# Patient Record
Sex: MALE | URBAN - METROPOLITAN AREA
[De-identification: names, ages, dates, MRNs, and addresses within clinical notes are randomized per-mention and may not be internally consistent; named-entity substitution may affect disease eponyms.]

---

## 2024-02-01 ENCOUNTER — LAB (OUTPATIENT)
Dept: URBAN - METROPOLITAN AREA MEDICAL CENTER 25 | Facility: MEDICAL CENTER | Age: 84
End: 2024-02-01
Payer: MEDICARE

## 2024-02-01 DIAGNOSIS — D64.89 ANEMIA DUE TO OTHER CAUSE: ICD-10-CM

## 2024-02-01 PROCEDURE — 99254 IP/OBS CNSLTJ NEW/EST MOD 60: CPT | Performed by: INTERNAL MEDICINE

## 2024-02-02 ENCOUNTER — LAB (OUTPATIENT)
Dept: URBAN - METROPOLITAN AREA MEDICAL CENTER 25 | Facility: MEDICAL CENTER | Age: 84
End: 2024-02-02
Payer: MEDICARE

## 2024-02-02 DIAGNOSIS — D64.89 ANEMIA DUE TO OTHER CAUSE: ICD-10-CM

## 2024-02-02 PROCEDURE — 99231 SBSQ HOSP IP/OBS SF/LOW 25: CPT | Performed by: INTERNAL MEDICINE

## 2024-02-05 ENCOUNTER — LAB (OUTPATIENT)
Dept: URBAN - METROPOLITAN AREA MEDICAL CENTER 25 | Facility: MEDICAL CENTER | Age: 84
End: 2024-02-05
Payer: MEDICARE

## 2024-02-05 DIAGNOSIS — D64.89 ANEMIA DUE TO OTHER CAUSE: ICD-10-CM

## 2024-02-05 PROCEDURE — 99232 SBSQ HOSP IP/OBS MODERATE 35: CPT | Performed by: INTERNAL MEDICINE

## 2024-07-23 ENCOUNTER — OFFICE VISIT (OUTPATIENT)
Dept: URBAN - METROPOLITAN AREA CLINIC 19 | Facility: CLINIC | Age: 84
End: 2024-07-23
Payer: MEDICARE

## 2024-07-23 ENCOUNTER — DASHBOARD ENCOUNTERS (OUTPATIENT)
Age: 84
End: 2024-07-23

## 2024-07-23 VITALS
HEART RATE: 76 BPM | TEMPERATURE: 98.6 F | SYSTOLIC BLOOD PRESSURE: 130 MMHG | DIASTOLIC BLOOD PRESSURE: 70 MMHG | WEIGHT: 177 LBS

## 2024-07-23 DIAGNOSIS — R13.12 OROPHARYNGEAL DYSPHAGIA: ICD-10-CM

## 2024-07-23 PROCEDURE — 99243 OFF/OP CNSLTJ NEW/EST LOW 30: CPT | Performed by: NURSE PRACTITIONER

## 2024-07-23 PROCEDURE — 99203 OFFICE O/P NEW LOW 30 MIN: CPT | Performed by: NURSE PRACTITIONER

## 2024-07-23 NOTE — HPI-TODAY'S VISIT:
82 yo male with PMH of HTN/HLD, GERD with dysphagia, macrocytic anemia, Falls, diastolic heart failure, Aflutter, ? dementia, prostate cancer presents for dysphagia. Sent upon referral from Dr. Chelsie Hale. A copy of this report will be sent to the referring provider.   Was brought into the ER 7/16/2024 for generalized increasing weakness/fatigue, falls CT head was negative. Chest x-ray was negative. EKG stable. Most likely not most likely progressive or worsening dementia no deficits to suggest acute CVA recommended PCP and neurology follow-up 7/16/2024 labs WBC 13.3, Hgb 10.9, , MCV high 112.1, LFTs normal  7/16/2024 CT A/P without contrast showed no hydronephrosis or obstructive urolithiasis.  Persistent masslike density near the ileocecal valve which again may represent prominent stool however given patient's age recommend correlation with previous colonoscopy results.  Fat-containing periumbilical hernia.    He presents with his personal caregiver. She reports he had a bout of hiccups for 3 days staright, was put on a high dose steroid which resolved it. Then after he went to hospital, hiccups back. Having more instances of indigestion/reflux. He c/o discomfort prior to and after meals. Progressively worsening swallowing issues. Now has to take one pill at a time, having difficulty getting meds down. He eats a soft mechanical diet. Solid foods seem to be either getting stuck or seems to go down the wrong way so he has a lot of coughing. Concern for aspiration. Currently he is taking Pantoprazole 40mg in the AM and Famotidine 40mg BID.  His caregiver has not noticed much improvement besides no further hiccups. Feels actually symptoms seem worse. Caregiver reports he had some constipation a few weeks ago. He is incontinent of both urine and stool. Wears depends. Started prune juice and stool softener. BMs are about every other day. Stools are solid. No bloody or black stools. Caregiver reports he has signs of dementia, has been mentioned in medical records as a possibility but that he has never officially been diagnosed.  He does not complain of abdominal pain.  Unknown when last colonoscopy was. He says it was well over 10 years ago and that it was normal. No known family history of GI or colon cancer.

## 2024-07-23 NOTE — PHYSICAL EXAM CONSTITUTIONAL:
well developed, well nourished , in no acute distress , ambulating with a walker, unsteady shuffled gait, normal communication ability He is alert to self, place, year only, knows he is at a new doctor office but unsure exactly why.

## 2024-07-23 NOTE — PHYSICAL EXAM GASTROINTESTINAL
Abdomen , soft, nontender, nondistended , no guarding or rigidity , normal bowel sounds , Liver and Spleen,  no hepatosplenomegaly

## 2024-08-30 ENCOUNTER — OFFICE VISIT (OUTPATIENT)
Dept: URBAN - METROPOLITAN AREA CLINIC 19 | Facility: CLINIC | Age: 84
End: 2024-08-30